# Patient Record
Sex: FEMALE
[De-identification: names, ages, dates, MRNs, and addresses within clinical notes are randomized per-mention and may not be internally consistent; named-entity substitution may affect disease eponyms.]

---

## 2017-09-13 ENCOUNTER — HOSPITAL ENCOUNTER (EMERGENCY)
Dept: HOSPITAL 31 - C.ER | Age: 22
Discharge: HOME | End: 2017-09-13
Payer: COMMERCIAL

## 2017-09-13 VITALS — DIASTOLIC BLOOD PRESSURE: 67 MMHG | HEART RATE: 71 BPM | RESPIRATION RATE: 18 BRPM | SYSTOLIC BLOOD PRESSURE: 102 MMHG

## 2017-09-13 VITALS — TEMPERATURE: 98 F

## 2017-09-13 DIAGNOSIS — Z3A.01: ICD-10-CM

## 2017-09-13 DIAGNOSIS — O20.0: Primary | ICD-10-CM

## 2017-09-13 LAB
ALBUMIN/GLOB SERPL: 1.4 {RATIO} (ref 1–2.1)
ALP SERPL-CCNC: 48 U/L (ref 38–126)
ALT SERPL-CCNC: 28 U/L (ref 9–52)
APTT BLD: 34 SECONDS (ref 21–34)
AST SERPL-CCNC: 24 U/L (ref 14–36)
BACTERIA #/AREA URNS HPF: (no result) /[HPF]
BASOPHILS # BLD AUTO: 0.1 K/UL (ref 0–0.2)
BASOPHILS NFR BLD: 0.7 % (ref 0–2)
BILIRUB SERPL-MCNC: 0.5 MG/DL (ref 0.2–1.3)
BILIRUB UR-MCNC: NEGATIVE MG/DL
BUN SERPL-MCNC: 7 MG/DL (ref 7–17)
CALCIUM SERPL-MCNC: 8.6 MG/DL (ref 8.6–10.4)
CHLORIDE SERPL-SCNC: 106 MMOL/L (ref 98–107)
CO2 SERPL-SCNC: 24 MMOL/L (ref 22–30)
EOSINOPHIL # BLD AUTO: 0.1 K/UL (ref 0–0.7)
EOSINOPHIL NFR BLD: 1.2 % (ref 0–4)
ERYTHROCYTE [DISTWIDTH] IN BLOOD BY AUTOMATED COUNT: 25.5 % (ref 11.5–14.5)
GLOBULIN SER-MCNC: 3 GM/DL (ref 2.2–3.9)
GLUCOSE SERPL-MCNC: 77 MG/DL (ref 65–105)
GLUCOSE UR STRIP-MCNC: NORMAL MG/DL
HCT VFR BLD CALC: 36.8 % (ref 34–47)
INR PPP: 1.2
KETONES UR STRIP-MCNC: NEGATIVE MG/DL
LEUKOCYTE ESTERASE UR-ACNC: (no result) LEU/UL
LYMPHOCYTES # BLD AUTO: 2.6 K/UL (ref 1–4.3)
LYMPHOCYTES NFR BLD AUTO: 30.6 % (ref 20–40)
MCH RBC QN AUTO: 22.9 PG (ref 27–31)
MCHC RBC AUTO-ENTMCNC: 31.5 G/DL (ref 33–37)
MCV RBC AUTO: 72.6 FL (ref 81–99)
MONOCYTES # BLD: 0.9 K/UL (ref 0–0.8)
MONOCYTES NFR BLD: 10.6 % (ref 0–10)
NRBC BLD AUTO-RTO: 0 % (ref 0–2)
PH UR STRIP: 7 [PH] (ref 5–8)
PLATELET # BLD: 147 K/UL (ref 130–400)
PMV BLD AUTO: 8.8 FL (ref 7.2–11.7)
POTASSIUM SERPL-SCNC: 4 MMOL/L (ref 3.6–5.2)
PROT SERPL-MCNC: 7.3 G/DL (ref 6.3–8.3)
PROT UR STRIP-MCNC: NEGATIVE MG/DL
RBC # UR STRIP: (no result) /UL
RBC #/AREA URNS HPF: 11 /HPF (ref 0–3)
SODIUM SERPL-SCNC: 138 MMOL/L (ref 132–148)
SP GR UR STRIP: 1.02 (ref 1–1.03)
UROBILINOGEN UR-MCNC: NORMAL MG/DL (ref 0.2–1)
WBC # BLD AUTO: 8.4 K/UL (ref 4.8–10.8)
WBC #/AREA URNS HPF: 14 /HPF (ref 0–5)

## 2017-09-13 PROCEDURE — 85730 THROMBOPLASTIN TIME PARTIAL: CPT

## 2017-09-13 PROCEDURE — 81001 URINALYSIS AUTO W/SCOPE: CPT

## 2017-09-13 PROCEDURE — 96375 TX/PRO/DX INJ NEW DRUG ADDON: CPT

## 2017-09-13 PROCEDURE — 85610 PROTHROMBIN TIME: CPT

## 2017-09-13 PROCEDURE — 96374 THER/PROPH/DIAG INJ IV PUSH: CPT

## 2017-09-13 PROCEDURE — 83690 ASSAY OF LIPASE: CPT

## 2017-09-13 PROCEDURE — 99285 EMERGENCY DEPT VISIT HI MDM: CPT

## 2017-09-13 PROCEDURE — 96361 HYDRATE IV INFUSION ADD-ON: CPT

## 2017-09-13 PROCEDURE — 84703 CHORIONIC GONADOTROPIN ASSAY: CPT

## 2017-09-13 PROCEDURE — 76805 OB US >/= 14 WKS SNGL FETUS: CPT

## 2017-09-13 PROCEDURE — 93005 ELECTROCARDIOGRAM TRACING: CPT

## 2017-09-13 PROCEDURE — 85025 COMPLETE CBC W/AUTO DIFF WBC: CPT

## 2017-09-13 PROCEDURE — 80162 ASSAY OF DIGOXIN TOTAL: CPT

## 2017-09-13 PROCEDURE — 84702 CHORIONIC GONADOTROPIN TEST: CPT

## 2017-09-13 PROCEDURE — 76817 TRANSVAGINAL US OBSTETRIC: CPT

## 2017-09-13 PROCEDURE — 80053 COMPREHEN METABOLIC PANEL: CPT

## 2017-09-13 NOTE — C.PDOC
History Of Present Illness





21 year old female with a hx of congenital heart disease, was sent in by the 

clinic today for evaluation of abdominal pain, vomiting, and low blood pressure 

according to the report. Patient complains of nausea, few episodes of vomiting, 

headache, non-radiating epigastric abdominal pain, and weakness for a week. 

Patient notes the weakness prompted the clinic visit today. Denies fever, 

diarrhea, chest pain, SOB, urinary symptoms, or any other complaints.





Time Seen by Provider: 17 18:03


Chief Complaint (Nursing): Abdominal Pain


History Per: Patient


History/Exam Limitations: no limitations


Onset/Duration Of Symptoms: Days (a week)


Current Symptoms Are (Timing): Still Present


Severity: Mild


Location Of Pain/Discomfort: Epigastric


Quality Of Discomfort: "Pain"


Associated Symptoms: Nausea, Vomiting.  denies: Fever, Chills, Diarrhea, 

Urinary Symptoms


Exacerbating Factors: None


Alleviating Factors: None


Recent travel outside of the [place]: No


Additional History Per: Patient





Past Medical History


Reviewed: Historical Data, Nursing Documentation, Vital Signs


Vital Signs: 


 Last Vital Signs











Temp  98 F   17 21:30


 


Pulse  71   17 21:30


 


Resp  18   17 21:30


 


BP  102/67   17 21:30


 


Pulse Ox  100   17 21:30














- Medical History


PMH: CHF


Family History: States: Unknown Family Hx





- Social History


Hx Alcohol Use: No


Hx Substance Use: No





- Immunization History


Hx Tetanus Toxoid Vaccination: No


Hx Influenza Vaccination: No





Review Of Systems


Except As Marked, All Systems Reviewed And Found Negative.


Constitutional: Positive for: Weakness.  Negative for: Fever, Chills


Cardiovascular: Negative for: Chest Pain


Respiratory: Negative for: Shortness of Breath


Gastrointestinal: Positive for: Nausea, Vomiting, Abdominal Pain.  Negative for

: Diarrhea


Genitourinary: Negative for: Dysuria, Hematuria, Vaginal Discharge, Vaginal 

Bleeding


Neurological: Positive for: Headache





Physical Exam





- Physical Exam


Appears: Non-toxic, No Acute Distress


Skin: Warm, Dry, Pale


Head: Atraumatic, Normacephalic


Eye(s): bilateral: Normal Inspection, EOMI


Oral Mucosa: Moist


Neck: Normal ROM


Chest: Symmetrical


Cardiovascular: Rhythm Regular, Murmur (Systolic murmur)


Respiratory: Normal Breath Sounds, No Rales, No Rhonchi, No Wheezing


Gastrointestinal/Abdominal: Soft, Tenderness (Mild epigastric tenderness), No 

Distention, No Guarding, No Rebound, No Hernia


Back: Normal Inspection, No CVA Tenderness


Extremity: Bilateral: Atraumatic, Normal Color And Temperature, Normal ROM


Neurological/Psych: Oriented x3, Normal Speech


Gait: Steady





ED Course And Treatment





- Laboratory Results


Result Diagrams: 


 17 18:39





 17 18:39


Lab Interpretation: No Acute Changes


ECG: Interpreted By Me, Viewed By Me


ECG Interpretation: No Acute Changes


O2 Sat by Pulse Oximetry: 96 (RA)


Pulse Ox Interpretation: Normal





- CT Scan/US


  ** TV US


Other Rad Studies (CT/US): Read By Radiologist, Radiology Report Reviewed


CT/US Interpretation: FINDINGS:  Gestation: Single viable intrauterine 

pregnancy with estimated gestational age based upon CRL.  measurement of 0.75 

cm of 6 weeks 5 days. Cardiac activity is seen with a rate of 132 beats per.  

minute.  Placenta/amniotic fluid: Cannot be adequately evaluated due to the 

early gestational age.  Uterus/cervix: Cervix is 3.0 cm in length and closed.  

No myometrial mass.  Ovaries: Corpus luteum cyst right ovary. Bilateral ovaries 

are unremarkable.  No mass.  Free fluid: Small amount of simple free fluid in 

the pelvic cul-de-sac.  IMPRESSION:  Single viable intrauterine pregnancy as 

described. No acute findings.





Medical Decision Making


Medical Decision Making: 





Impression: 


* Nausea, few episodes of vomiting, headache, non-radiating epigastric 

abdominal pain, and weakness for a week.





Prior records reviewed: 


* Patient has no prior records in this ER. Patient clinic evaluation reviewed.





Plan:


* EKG


* Pepcid


* IV fluids


* Labs


* UA





Progress, Reassess and Dispo:


EKG shows NS at 69 bpm with right ventricular hypertrophy, no priors available 

for comparison


Urine HCG was positive. Patient made aware and states she has not missed any 

menses and did not know. Will order US and BHCG.


All other labs reviewed. US shows IUP 6 weeks 5 days. 


On re-eval, patient is in no acute distress at this time and is improving with 

the nausea and abdominal pain. Patient is currently afebrile. I discussed all 

results and provide copy of labs and US report. Patient was instructed to 

follow up with her clinic and ob/gyn in timely manner for further evaluation 

and to return if symptoms worsens.





Disposition


Counseled Patient/Family Regarding: Diagnosis, Need For Followup, Rx Given





- Disposition


Referrals: 


[facility] [Outside]


[facility] [Outside]


Disposition: HOME/ ROUTINE


Disposition Time: 21:00


Condition: STABLE


Additional Instructions: 


Gertrude laboratorios y ultrasonido le muestran que est embarazada 6 semanas


Por favor, siga en la clnica y con ob / gyn


Minerva muchos lquidos para mantenerse hidratado


regresar al hospital si los sntomas empeoran u otra preocupacin


Prescriptions: 


Prenatal Vit No.124/Iron/Folic [Prenatal Vitamin Tablet] 1 each PO DAILY #30 

tablet


Instructions:  Threatened Miscarriage (ED), Pregnancy (ED)


Forms:  [facility] (English)


Print Language: German





- POA


Present On Arrival: None





- Clinical Impression


Clinical Impression: 


 Pregnancy test positive, Threatened 








- Scribe Statement


The provider has reviewed the documentation as recorded by the Scribe





Carlos kramer





All medical record entries made by the Scribe were at my direction and 

personally dictated by me. I have reviewed the chart and agree that the record 

accurately reflects my personal performance of the history, physical exam, 

medical decision making, and the department course for this patient. I have 

also personally directed, reviewed, and agree with the discharge instructions 

and disposition.

## 2017-09-14 VITALS — OXYGEN SATURATION: 96 %

## 2017-09-14 NOTE — CARD
--------------- APPROVED REPORT --------------





EKG Measurement

Heart [id number]

WV [id number]

[id number]

[id number]

[id number]



<Conclusion>

*** Poor data quality, interpretation may be adversely affected

Normal sinus rhythm

Right ventricular hypertrophy

Abnormal ECG

## 2017-09-14 NOTE — US
Pelvic ultrasound 



History: Pregnancy.  Abdominal pain. 



Comparison: None available. 



Technique: Real-time sonography was performed through the pelvis 

utilizing transabdominal and transvaginal techniques. 



Findings: 



Uterus: 9.9 x 4.8 x 6.8 centimeters.  Anteverted.  Heterogeneous 

echotexture. 



Cervix measures 3.0 centimeters. 



Intrauterine gestational sac measuring 1.9 centimeters corresponding 

to a gestational age of 6 weeks and 2 days. 



Yolk sac measures 3 millimeters. 



Crown-rump length measures 7.5 millimeters corresponding to a 

gestational age of 6 weeks and 5 days. 



Fetal heart rate of 132 beats per minute. 



Small amount of free fluid within the pelvic cul-de-sac. 



Right ovary: 3.3 x 2.5 x 2.7 centimeters. Normal flow.  Heterogeneous 

corpus luteal cyst measuring 1.8 x 1.7 x 1.8 centimeters. 



Left ovary: 2.6 x 2.0 x 2.1 centimeters. Normal flow.  



Impression: 



Single viable intrauterine pregnancy corresponding to a gestational 

age of 6 weeks and 5 days with crown-rump length of 7.5 millimeters.  

Fetal heart rate of 132 beats per minute. 



Small amount of free fluid within the pelvic cul-de-sac. 



Heterogeneous probable corpus luteal cyst in the right ovary 

measuring 1.8 centimeters. 



Limited 1st trimester ultrasound for viability purposes only.  

Continued interval followup with serial ultrasound, serial HCG levels,

 and gynecological consultation would be helpful if clinically 

indicated. 



These findings were preliminarily reported at 8:46 p.m. on 09/13/2017 

by Dr. Otf Roger from [employer].

## 2019-04-09 ENCOUNTER — HOSPITAL ENCOUNTER (EMERGENCY)
Dept: HOSPITAL 31 - C.ER | Age: 24
Discharge: HOME | End: 2019-04-09
Payer: COMMERCIAL

## 2019-04-09 VITALS
OXYGEN SATURATION: 99 % | RESPIRATION RATE: 20 BRPM | TEMPERATURE: 98.5 F | DIASTOLIC BLOOD PRESSURE: 68 MMHG | HEART RATE: 92 BPM | SYSTOLIC BLOOD PRESSURE: 114 MMHG

## 2019-04-09 DIAGNOSIS — J11.1: Primary | ICD-10-CM

## 2019-04-09 LAB
BILIRUB UR-MCNC: NEGATIVE MG/DL
GLUCOSE UR STRIP-MCNC: NORMAL MG/DL
HCG,QUALITATIVE URINE: NEGATIVE
LEUKOCYTE ESTERASE UR-ACNC: (no result) LEU/UL
PH UR STRIP: 5 [PH] (ref 5–8)
PROT UR STRIP-MCNC: (no result) MG/DL
RBC # UR STRIP: (no result) /UL
SP GR UR STRIP: 1.02 (ref 1–1.03)
SQUAMOUS EPITHIAL: 5 /HPF (ref 0–5)
UROBILINOGEN UR-MCNC: NORMAL MG/DL (ref 0.2–1)

## 2019-04-09 NOTE — C.PDOC
Time Seen by Provider: 04/09/19 11:57


Chief Complaint (Nursing): Flu-like Symptoms


History Per: Patient


Onset/Duration Of Symptoms: Days (4)


Current Symptoms Are (Timing): Still Present


Associated Symptoms: Sore Throat, Cough, Sputum, Myalgias, Nasal Congestion


Severity: Moderate


Additional History Per: Prior Records





Past Medical History


Reviewed: Historical Data, Nursing Documentation, Vital Signs


Vital Signs: 





                                Last Vital Signs











Temp  98.4 F   04/09/19 11:22


 


Pulse  98 H  04/09/19 11:22


 


Resp  18   04/09/19 11:22


 


BP  111/76   04/09/19 11:22


 


Pulse Ox  100   04/09/19 11:22














- Medical History


PMH: CHF (congenital heart disease)


Family History: States: Unknown Family Hx





- Social History


Hx Tobacco Use: No


Hx Alcohol Use: No


Hx Substance Use: No





- Immunization History


Hx Tetanus Toxoid Vaccination: No


Hx Influenza Vaccination: No





Review Of Systems


Except As Marked, All Systems Reviewed And Found Negative.


Constitutional: Positive for: Malaise


ENT: Positive for: Nose Congestion, Throat Pain


Cardiovascular: Negative for: Chest Pain


Respiratory: Positive for: Cough.  Negative for: Shortness of Breath, Hemoptysis


Genitourinary: Negative for: Dysuria


Musculoskeletal: Negative for: Neck Pain


Skin: Negative for: Rash


Neurological: Negative for: Weakness, Numbness





Physical Exam





- Physical Exam


Appears: Non-toxic, No Acute Distress


Skin: Normal Color, Warm, Dry, No Rash


Head: Atraumatic, Normacephalic


Eye(s): bilateral: PERRL, EOMI


Ear(s): Bilateral: Normal


Oral Mucosa: Moist


Throat: Normal


Neck: Normal ROM, Supple


Cardiovascular: Rhythm Regular, Murmur (systolic)


Respiratory: Normal Breath Sounds, No Accessory Muscle Use


Gastrointestinal/Abdominal: Soft, No Tenderness


Back: No CVA Tenderness


Extremity: Normal ROM, No Pedal Edema, No Calf Tenderness


Neurological/Psych: Oriented x3, Normal Speech, Normal Motor





ED Course And Treatment





- Laboratory Results


Lab Results: 





                                        











Urine HCG, Qual  Negative  (NEGATIVE)   04/09/19  12:21    








                                        











Urine HCG, Qual  Negative  (NEGATIVE)   04/09/19  12:21    











Interpretation Of Abnormal: U/A abnormal, but pt is on her menstrual period and 

has no urinary symptoms


Urine Pregnancy POC: Negative


O2 Sat by Pulse Oximetry: 100


Pulse Ox Interpretation: Normal





- Radiology


CXR: Viewed By Me, Read By Radiologist


CXR Interpretation: Yes: No Acute Disease.  No: Infiltrates





Disposition


Counseled Patient/Family Regarding: Studies Performed, Diagnosis, Need For 

Followup, Rx Given





- Disposition


Referrals: 


[facility] [Outside]


Disposition: HOME/ ROUTINE


Disposition Time: 12:57


Condition: STABLE


Additional Instructions: 


Follow up with your doctor or in the clinic. Return to the ER if you develop 

high fever, shortness of breath, chest pain, worsening of symptoms or if you 

have any other concerns. 


Prescriptions: 


Amoxicillin 875 mg PO BID #20 tab


Promethazine/Dextromethorphan [Promethazine-Dm Syrup] 5 ml PO Q4 PRN #1 syrup


 PRN Reason: Cough And Congestion


Instructions:  Cough, Runny Nose, and the Common Cold (DC)


Forms:  [facility] (Kenyan), Gen Discharge Inst Kenyan


Print Language: Georgian





- Clinical Impression


Clinical Impression: 


 Influenza-like illness

## 2019-04-09 NOTE — RAD
HISTORY:

 Cough. h/o congenital heart disease. 



COMPARISON:

None available. 



TECHNIQUE:

Chest PA and lateral, 2 views



FINDINGS:





LUNGS:

No focal consolidation.



Please note that chest x-ray has limited sensitivity for the 

detection of pulmonary masses.



PLEURA:

No significant pleural effusion identified. No definite pneumothorax .



CARDIOVASCULAR:

Heart size appears top normal. 



OSSEOUS STRUCTURES:

No acute osseous abnormality identified.



VISUALIZED UPPER ABDOMEN:

Unremarkable.



OTHER FINDINGS:

None.



IMPRESSION:

No focal consolidation identified.